# Patient Record
Sex: FEMALE | Race: WHITE | NOT HISPANIC OR LATINO | URBAN - NONMETROPOLITAN AREA
[De-identification: names, ages, dates, MRNs, and addresses within clinical notes are randomized per-mention and may not be internally consistent; named-entity substitution may affect disease eponyms.]

---

## 2018-12-04 PROBLEM — H52.223: Noted: 2018-12-04

## 2018-12-04 PROBLEM — H52.01: Noted: 2018-12-04

## 2018-12-04 PROBLEM — H52.12: Noted: 2018-12-04

## 2018-12-04 PROBLEM — H53.001: Noted: 2018-12-04

## 2019-12-06 ENCOUNTER — IMPORTED ENCOUNTER (OUTPATIENT)
Dept: URBAN - NONMETROPOLITAN AREA CLINIC 1 | Facility: CLINIC | Age: 24
End: 2019-12-06

## 2019-12-06 PROCEDURE — 92014 COMPRE OPH EXAM EST PT 1/>: CPT

## 2019-12-06 PROCEDURE — 92310 CONTACT LENS FITTING OU: CPT

## 2019-12-06 PROCEDURE — 92015 DETERMINE REFRACTIVE STATE: CPT

## 2019-12-06 NOTE — PATIENT DISCUSSION
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

## 2020-12-08 ENCOUNTER — PREPPED CHART (OUTPATIENT)
Dept: URBAN - NONMETROPOLITAN AREA CLINIC 4 | Facility: CLINIC | Age: 25
End: 2020-12-08

## 2020-12-08 ENCOUNTER — IMPORTED ENCOUNTER (OUTPATIENT)
Dept: URBAN - NONMETROPOLITAN AREA CLINIC 1 | Facility: CLINIC | Age: 25
End: 2020-12-08

## 2020-12-08 PROCEDURE — 92310 CONTACT LENS FITTING OU: CPT

## 2020-12-08 PROCEDURE — 92014 COMPRE OPH EXAM EST PT 1/>: CPT

## 2020-12-08 PROCEDURE — V2521 CNTCT LENS HYDROPHILIC TORIC: HCPCS

## 2020-12-08 PROCEDURE — V2520 CONTACT LENS HYDROPHILIC: HCPCS

## 2020-12-08 PROCEDURE — 92015 DETERMINE REFRACTIVE STATE: CPT

## 2020-12-08 NOTE — PATIENT DISCUSSION
Compound Hyperpic Astigmatism OD/Compound Myopic Astigmatism OS-  discussed findings w/patient-  new spectacle/CL Rx issued-  continue to monitor yearly or prnAmblyopia OD-  discussed findings w/patient-  no changes noted at this time-  continue to monitor yearly or prn; 's Notes: MR 12/8/2020DFE 12/8/2020

## 2021-03-23 NOTE — PATIENT DISCUSSION
GLAUCOMA:  I have talked with the patient about my impressions, explained our treatment plan, and have answered all questions and patient understands. Continue present eye drops. Patient to follow up in 6 months. Will do VF and OCT then.

## 2021-03-23 NOTE — PATIENT DISCUSSION
DRY EYE COUNSELING:  I have explained that dry eye syndrome may cause many ocular symptoms including irritation, burning, tearing, and blurry vision. Frequent high quality artificial tears will help relieve these symptoms. Samples of Xiidra given, bid OD. Will refer to Dr Etelvina Parish for lid consult.

## 2022-02-02 ASSESSMENT — TONOMETRY
OD_IOP_MMHG: 18
OS_IOP_MMHG: 19

## 2022-02-02 ASSESSMENT — VISUAL ACUITY
OS_CC: 20/30+3
OU_CC: 20/25+3
OU_CC: 20/20
OD_CC: 20/30+2

## 2022-02-03 ENCOUNTER — COMPREHENSIVE EXAM (OUTPATIENT)
Dept: URBAN - NONMETROPOLITAN AREA CLINIC 4 | Facility: CLINIC | Age: 27
End: 2022-02-03

## 2022-02-03 DIAGNOSIS — H52.12: ICD-10-CM

## 2022-02-03 DIAGNOSIS — H52.01: ICD-10-CM

## 2022-02-03 PROCEDURE — 92310 CONTACT LENS FITTING OU: CPT

## 2022-02-03 PROCEDURE — 92015 DETERMINE REFRACTIVE STATE: CPT

## 2022-02-03 PROCEDURE — 92014 COMPRE OPH EXAM EST PT 1/>: CPT

## 2022-02-03 ASSESSMENT — VISUAL ACUITY
OD_SC: 20/60
OS_SC: 20/70
OS_PH: 20/40
OD_PH: 20/40-2

## 2022-02-03 ASSESSMENT — TONOMETRY
OD_IOP_MMHG: 22
OS_IOP_MMHG: 20

## 2022-04-09 ASSESSMENT — VISUAL ACUITY
OS_CC: 20/40
OD_CC: 20/50
OU_CC: 20/20
OS_SC: 20/30+3
OU_SC: 20/25+3
OS_SC: 20/40
OS_PH: 20/25
OD_SC: 20/30+2
OD_PH: 20/40+

## 2022-04-09 ASSESSMENT — TONOMETRY
OS_IOP_MMHG: 19
OS_IOP_MMHG: 22
OD_IOP_MMHG: 18
OD_IOP_MMHG: 20

## 2022-04-28 NOTE — PATIENT DISCUSSION
Refer to [facility] Specialist. He should definitely benefit.